# Patient Record
Sex: MALE | Race: WHITE | Employment: OTHER | URBAN - METROPOLITAN AREA
[De-identification: names, ages, dates, MRNs, and addresses within clinical notes are randomized per-mention and may not be internally consistent; named-entity substitution may affect disease eponyms.]

---

## 2024-05-15 ENCOUNTER — HOSPITAL ENCOUNTER (EMERGENCY)
Facility: CLINIC | Age: 24
Discharge: HOME | End: 2024-05-15
Attending: EMERGENCY MEDICINE
Payer: COMMERCIAL

## 2024-05-15 VITALS
TEMPERATURE: 98 F | SYSTOLIC BLOOD PRESSURE: 131 MMHG | DIASTOLIC BLOOD PRESSURE: 77 MMHG | RESPIRATION RATE: 17 BRPM | HEART RATE: 75 BPM | OXYGEN SATURATION: 97 % | WEIGHT: 261 LBS

## 2024-05-15 DIAGNOSIS — J06.9 VIRAL UPPER RESPIRATORY TRACT INFECTION: Primary | ICD-10-CM

## 2024-05-15 PROCEDURE — 99283 EMERGENCY DEPT VISIT LOW MDM: CPT

## 2024-05-15 RX ORDER — LAMOTRIGINE 200 MG/1
200 TABLET ORAL DAILY
COMMUNITY

## 2024-05-15 RX ORDER — AZITHROMYCIN 200 MG/5ML
250 POWDER, FOR SUSPENSION ORAL DAILY
Qty: 31.5 ML | Refills: 0 | Status: SHIPPED | OUTPATIENT
Start: 2024-05-15 | End: 2024-05-20

## 2024-05-15 RX ORDER — OXCARBAZEPINE 600 MG/1
900 TABLET, FILM COATED ORAL 2 TIMES DAILY
COMMUNITY

## 2024-05-15 RX ORDER — LACOSAMIDE 50 MG/1
200 TABLET ORAL 2 TIMES DAILY
COMMUNITY

## 2024-05-15 ASSESSMENT — ENCOUNTER SYMPTOMS: COUGH: 1

## 2024-05-15 NOTE — ED PROVIDER NOTES
[facility]  [address] [facility] [address]  Phone: [phone]        [facility] ED  EMERGENCY DEPARTMENT ENCOUNTER      Pt Name: Henry Guerrier  MRN: [id number]  Birthdate 2000  Date of evaluation: 5/15/2024  Provider: Chidi Fournier DO    CHIEF COMPLAINT       Chief Complaint   Patient presents with    Fever     1 week     URI         HISTORY OF PRESENT ILLNESS   (Location/Symptom, Timing/Onset,Context/Setting, Quality, Duration, Modifying Factors, Severity)  Note limiting factors.   Henry Guerrier is a 24 y.o. male who presents to the emergency department for the evaluation of cough and fever.  Dad reports highest temperature is 100 degrees.  Did get antipyretics today with some improvement.  Cough is got worse over the past few days but symptoms have been present for a week.  No vomiting or diarrhea.    Nursing Notes were reviewed.    REVIEW OF SYSTEMS    (2-9systems for level 4, 10 or more for level 5)     Review of Systems   Constitutional:  Positive for fever.   HENT:  Negative for sore throat.    Respiratory:  Positive for cough.        Except asnoted above the remainder of the review of systems was reviewed and negative.       PAST MEDICAL HISTORY   No past medical history on file.      SURGICAL HISTORY     No past surgical history on file.      CURRENT MEDICATIONS     Previous Medications    CLOBAZAM (ONFI PO)    Take by mouth    LACOSAMIDE (VIMPAT) 50 MG TABS TABLET    Take 4 tablets by mouth 2 times daily.    LAMOTRIGINE (LAMICTAL) 200 MG TABLET    Take 1 tablet by mouth daily    OXCARBAZEPINE (TRILEPTAL) 600 MG TABLET    Take 1.5 tablets by mouth 2 times daily       ALLERGIES     Patient has no known allergies.    FAMILY HISTORY     No family history on file.       SOCIAL HISTORY       Social History     Socioeconomic History    Marital status: Single       SCREENINGS             PHYSICAL EXAM    (up to 7 for level 4, 8 or more for level 5)     ED Triage Vitals